# Patient Record
Sex: MALE | Race: BLACK OR AFRICAN AMERICAN | NOT HISPANIC OR LATINO | Employment: UNEMPLOYED | URBAN - METROPOLITAN AREA
[De-identification: names, ages, dates, MRNs, and addresses within clinical notes are randomized per-mention and may not be internally consistent; named-entity substitution may affect disease eponyms.]

---

## 2024-09-05 ENCOUNTER — OFFICE VISIT (OUTPATIENT)
Dept: FAMILY MEDICINE CLINIC | Facility: CLINIC | Age: 59
End: 2024-09-05
Payer: COMMERCIAL

## 2024-09-05 ENCOUNTER — HOSPITAL ENCOUNTER (OUTPATIENT)
Dept: GENERAL RADIOLOGY | Facility: HOSPITAL | Age: 59
Discharge: HOME | End: 2024-09-05
Admitting: FAMILY MEDICINE
Payer: COMMERCIAL

## 2024-09-05 VITALS
SYSTOLIC BLOOD PRESSURE: 155 MMHG | WEIGHT: 259.8 LBS | DIASTOLIC BLOOD PRESSURE: 99 MMHG | HEIGHT: 71 IN | OXYGEN SATURATION: 97 % | BODY MASS INDEX: 36.37 KG/M2 | HEART RATE: 74 BPM

## 2024-09-05 DIAGNOSIS — M25.562 CHRONIC PAIN OF LEFT KNEE: Primary | ICD-10-CM

## 2024-09-05 DIAGNOSIS — G89.29 CHRONIC PAIN OF LEFT KNEE: ICD-10-CM

## 2024-09-05 DIAGNOSIS — Z12.11 COLON CANCER SCREENING: ICD-10-CM

## 2024-09-05 DIAGNOSIS — E66.01 CLASS 2 SEVERE OBESITY DUE TO EXCESS CALORIES WITH SERIOUS COMORBIDITY AND BODY MASS INDEX (BMI) OF 36.0 TO 36.9 IN ADULT: ICD-10-CM

## 2024-09-05 DIAGNOSIS — I10 PRIMARY HYPERTENSION: ICD-10-CM

## 2024-09-05 DIAGNOSIS — G89.29 CHRONIC PAIN OF LEFT KNEE: Primary | ICD-10-CM

## 2024-09-05 DIAGNOSIS — M25.562 CHRONIC PAIN OF LEFT KNEE: ICD-10-CM

## 2024-09-05 PROBLEM — E66.812 CLASS 2 SEVERE OBESITY DUE TO EXCESS CALORIES WITH SERIOUS COMORBIDITY AND BODY MASS INDEX (BMI) OF 36.0 TO 36.9 IN ADULT: Status: ACTIVE | Noted: 2024-09-05

## 2024-09-05 PROCEDURE — 1160F RVW MEDS BY RX/DR IN RCRD: CPT | Performed by: FAMILY MEDICINE

## 2024-09-05 PROCEDURE — 73560 X-RAY EXAM OF KNEE 1 OR 2: CPT

## 2024-09-05 PROCEDURE — 3077F SYST BP >= 140 MM HG: CPT | Performed by: FAMILY MEDICINE

## 2024-09-05 PROCEDURE — 99204 OFFICE O/P NEW MOD 45 MIN: CPT | Performed by: FAMILY MEDICINE

## 2024-09-05 PROCEDURE — 3080F DIAST BP >= 90 MM HG: CPT | Performed by: FAMILY MEDICINE

## 2024-09-05 PROCEDURE — 1159F MED LIST DOCD IN RCRD: CPT | Performed by: FAMILY MEDICINE

## 2024-09-05 RX ORDER — ALBUTEROL SULFATE 90 UG/1
2 AEROSOL, METERED RESPIRATORY (INHALATION) AS NEEDED
COMMUNITY
Start: 2024-08-28

## 2024-09-05 RX ORDER — LISINOPRIL 20 MG/1
20 TABLET ORAL DAILY
COMMUNITY
Start: 2024-08-28

## 2024-09-05 RX ORDER — LISINOPRIL 10 MG/1
10 TABLET ORAL DAILY
COMMUNITY
Start: 2024-05-15

## 2024-09-05 NOTE — PROGRESS NOTES
New Patient Office Visit      Date: 2024   Patient Name: Salomon Argueta  : 1965   MRN: [id number]     Chief Complaint:    Chief Complaint   Patient presents with    Knee Pain     Pt states his left knee has been in pain he states he doesn't have any tissue in it . Its been an ongoing issue for a while       History of Present Illness: Salomon Argueta is a 59 y.o. male who is here today to establish care.  He currently lives with his fiancée.  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      Patient has a history of chronic left knee pain.  He notes that he was fairly physically active in his younger years and had a lot of strain on his knees.  He does note that he had an injury to his left knee and he has significant muscle wasting on his medial lower thigh.  He notes that due to this he can walk but whenever he tries to run or jog he will have significant weakness in his leg will give out.  He notes that is difficult for him to perform certain jobs and functions due to this.  He does note that he was previously following with a provider in [place] and was recommended a knee brace and an MRI, however he moved from the area and did not have any further follow-up.  He is interested today in establishing with a new orthopedics and beginning this workup once again.    Patient has a diagnosis of hypertension is currently taking lisinopril.  He notes that he underwent some blood work around 4 months ago and was told was overall normal.  Will obtain record.    Patient know his grandmother had cancer but he is unsure what type.  He would be interested in Cologuard today.      Subjective      Review of Systems:   Review of Systems   Musculoskeletal:  Positive for arthralgias.   All other systems reviewed and are negative.      Past Medical History:   Past Medical History:   Diagnosis Date    Hypertension        Past Surgical History: History reviewed. No pertinent surgical  "history.    Family History:   Family History   Problem Relation Age of Onset    Cancer Maternal Grandmother        Social History:   Social History     Socioeconomic History    Marital status: Single   Tobacco Use    Smoking status: Never    Smokeless tobacco: Never   Vaping Use    Vaping status: Never Used   Substance and Sexual Activity    Alcohol use: Not Currently     Comment: not since 2011    Drug use: Not Currently       Medications:     Current Outpatient Medications:     lisinopril (PRINIVIL,ZESTRIL) 10 MG tablet, Take 1 tablet by mouth Daily., Disp: , Rfl:     lisinopril (PRINIVIL,ZESTRIL) 20 MG tablet, Take 1 tablet by mouth Daily., Disp: , Rfl:     Ventolin  (90 Base) MCG/ACT inhaler, Inhale 2 puffs As Needed., Disp: , Rfl:     Allergies:   No Known Allergies    Objective     Physical Exam:  Vital Signs:   Vitals:    09/05/24 1258   BP: 155/99   Pulse: 74   SpO2: 97%   Weight: 118 kg (259 lb 12.8 oz)   Height: 180.3 cm (71\")     Body mass index is 36.23 kg/m².  Class 2 Severe Obesity (BMI >=35 and <=39.9). Obesity-related health conditions include the following: hypertension. Obesity is newly identified. BMI is is above average; BMI management plan is completed. We discussed portion control, increasing exercise, and Information on healthy weight added to patient's after visit summary.       Physical Exam  Vitals and nursing note reviewed.   Constitutional:       Appearance: Normal appearance. He is obese.   HENT:      Head: Normocephalic and atraumatic.      Nose: Nose normal.      Mouth/Throat:      Mouth: Mucous membranes are moist.   Eyes:      Extraocular Movements: Extraocular movements intact.      Pupils: Pupils are equal, round, and reactive to light.   Cardiovascular:      Rate and Rhythm: Normal rate and regular rhythm.   Pulmonary:      Effort: Pulmonary effort is normal.      Breath sounds: Normal breath sounds.   Abdominal:      General: Abdomen is flat. Bowel sounds are normal.      " Palpations: Abdomen is soft.   Musculoskeletal:         General: Normal range of motion.      Cervical back: Normal range of motion.        Legs:       Comments: Wasting of muscle on left lower medial thigh   Skin:     General: Skin is warm and dry.   Neurological:      General: No focal deficit present.      Mental Status: He is alert.   Psychiatric:         Mood and Affect: Mood normal.         Behavior: Behavior normal.         Thought Content: Thought content normal.         Judgment: Judgment normal.         Procedures    Results:   PHQ-9 Total Score: 0            Assessment / Plan      Assessment/Plan:   Diagnoses and all orders for this visit:    1. Chronic pain of left knee (Primary)  Assessment & Plan:  Will obtain x-ray of left knee today and refer to local orthopedics to establish care    Orders:  -     XR Knee 1 or 2 View Left; Future  -     Ambulatory Referral to Orthopedic Surgery    2. Class 2 severe obesity due to excess calories with serious comorbidity and body mass index (BMI) of 36.0 to 36.9 in adult  Assessment & Plan:  Patient's (Body mass index is 36.23 kg/m².) indicates that they are morbidly/severely obese (BMI > 40 or > 35 with obesity - related health condition) with health conditions that include hypertension . Weight is newly identified. BMI  is above average; BMI management plan is completed. We discussed portion control, increasing exercise, and Information on healthy weight added to patient's after visit summary.       3. Primary hypertension  Assessment & Plan:  Hypertension is stable and controlled  Continue current treatment regimen.  Weight loss.  Regular aerobic exercise.  Ambulatory blood pressure monitoring.  Blood pressure will be reassessed in 3 months.      4. Colon cancer screening  -     Cologuard - Stool, Per Rectum; Future         Follow Up:   Return in about 3 months (around 12/5/2024) for Annual physical.    Mitzy Mueller DO   [facility] [address]

## 2024-09-05 NOTE — ASSESSMENT & PLAN NOTE
Patient's (Body mass index is 36.23 kg/m².) indicates that they are morbidly/severely obese (BMI > 40 or > 35 with obesity - related health condition) with health conditions that include hypertension . Weight is newly identified. BMI  is above average; BMI management plan is completed. We discussed portion control, increasing exercise, and Information on healthy weight added to patient's after visit summary.

## 2024-09-12 ENCOUNTER — PATIENT ROUNDING (BHMG ONLY) (OUTPATIENT)
Dept: FAMILY MEDICINE CLINIC | Facility: CLINIC | Age: 59
End: 2024-09-12
Payer: COMMERCIAL

## 2024-10-14 ENCOUNTER — OFFICE VISIT (OUTPATIENT)
Age: 59
End: 2024-10-14
Payer: COMMERCIAL

## 2024-10-14 VITALS
HEIGHT: 70 IN | SYSTOLIC BLOOD PRESSURE: 158 MMHG | WEIGHT: 259 LBS | DIASTOLIC BLOOD PRESSURE: 88 MMHG | BODY MASS INDEX: 37.08 KG/M2

## 2024-10-14 DIAGNOSIS — M25.462 EFFUSION OF LEFT KNEE: ICD-10-CM

## 2024-10-14 DIAGNOSIS — M25.562 LEFT KNEE PAIN, UNSPECIFIED CHRONICITY: Primary | ICD-10-CM

## 2024-10-14 DIAGNOSIS — M17.12 PRIMARY OSTEOARTHRITIS OF LEFT KNEE: ICD-10-CM

## 2024-10-14 PROCEDURE — 3079F DIAST BP 80-89 MM HG: CPT | Performed by: STUDENT IN AN ORGANIZED HEALTH CARE EDUCATION/TRAINING PROGRAM

## 2024-10-14 PROCEDURE — 99204 OFFICE O/P NEW MOD 45 MIN: CPT | Performed by: STUDENT IN AN ORGANIZED HEALTH CARE EDUCATION/TRAINING PROGRAM

## 2024-10-14 PROCEDURE — 1159F MED LIST DOCD IN RCRD: CPT | Performed by: STUDENT IN AN ORGANIZED HEALTH CARE EDUCATION/TRAINING PROGRAM

## 2024-10-14 PROCEDURE — 1160F RVW MEDS BY RX/DR IN RCRD: CPT | Performed by: STUDENT IN AN ORGANIZED HEALTH CARE EDUCATION/TRAINING PROGRAM

## 2024-10-14 PROCEDURE — 3077F SYST BP >= 140 MM HG: CPT | Performed by: STUDENT IN AN ORGANIZED HEALTH CARE EDUCATION/TRAINING PROGRAM

## 2024-10-14 RX ORDER — MELOXICAM 15 MG/1
15 TABLET ORAL DAILY
Qty: 90 TABLET | Refills: 1 | Status: SHIPPED | OUTPATIENT
Start: 2024-10-14

## 2024-10-14 NOTE — PROGRESS NOTES
[facility] [place]     Office Visit       Date: 10/14/2024   Patient Name: Salomon Argueta  MRN: [id number]  YOB: 1965    Referring Physician: Mitzy Mueller, *     Chief Complaint:   Chief Complaint   Patient presents with    Left Knee - Pain       History of Present Illness:   Salomon Argueta is a 59 y.o. male who presents with left knee pain for 10 year(s). Onset atraumatic and gradual in nature. Pain is localized to the medial joint line and is a 6/10 on the pain scale. Pain is described as aching and stabbing. Associated symptoms include pain, popping, grinding, and giving way/buckling. The pain is worse with walking, standing, climbing stairs, sleeping, leisure, and rising from seated position; heat make it better. Previous treatments have included: bracing since symptom onset. Although some transient relief was reported with these interventions, these conservative measures have failed and symptoms have persisted. The patient is limited in daily activities and has had a significant decrease in quality of life as a result. He denies fevers, chills, or constitutional symptoms.    Subjective   Review of Systems: Review of Systems   Constitutional:  Negative for chills, fever, unexpected weight gain and unexpected weight loss.   HENT:  Negative for congestion, postnasal drip and rhinorrhea.    Eyes:  Negative for blurred vision.   Respiratory:  Negative for shortness of breath.    Cardiovascular:  Negative for leg swelling.   Gastrointestinal:  Negative for abdominal pain, nausea and vomiting.   Genitourinary:  Negative for difficulty urinating.   Musculoskeletal:  Positive for arthralgias. Negative for gait problem, joint swelling and myalgias.   Skin:  Negative for skin lesions and wound.   Neurological:  Negative for dizziness, weakness, light-headedness and numbness.   Hematological:  Does not bruise/bleed easily.  "  Psychiatric/Behavioral:  Negative for depressed mood.    All other systems reviewed and are negative.       I have reviewed the following portions of the patient's history:History of Present Illness and review of systems.    Past Medical History:   Past Medical History:   Diagnosis Date    Hypertension        Past Surgical History: No past surgical history on file.    Family History:   Family History   Problem Relation Age of Onset    Cancer Maternal Grandmother        Social History:   Social History     Socioeconomic History    Marital status: Single   Tobacco Use    Smoking status: Never    Smokeless tobacco: Never   Vaping Use    Vaping status: Never Used   Substance and Sexual Activity    Alcohol use: Not Currently     Comment: not since 2011    Drug use: Not Currently       Medications:   Current Outpatient Medications:     lisinopril (PRINIVIL,ZESTRIL) 10 MG tablet, Take 1 tablet by mouth Daily., Disp: , Rfl:     lisinopril (PRINIVIL,ZESTRIL) 20 MG tablet, Take 1 tablet by mouth Daily., Disp: , Rfl:     Ventolin  (90 Base) MCG/ACT inhaler, Inhale 2 puffs As Needed., Disp: , Rfl:     meloxicam (MOBIC) 15 MG tablet, Take 1 tablet by mouth Daily., Disp: 90 tablet, Rfl: 1    Allergies: No Known Allergies    I reviewed the patient's chief complaint, history of present illness, review of systems, past medical history, surgical history, family history, social history, medications and allergy list.     Objective    Vital Signs:   Vitals:    10/14/24 1333   BP: 158/88   Weight: 117 kg (259 lb)   Height: 176.5 cm (69.5\")     Body mass index is 37.7 kg/m².   Class 2 Severe Obesity (BMI >=35 and <=39.9). Obesity-related health conditions include the following: hypertension, coronary heart disease, diabetes mellitus, and impaired fasting glucose. Obesity is newly identified. BMI is is above average; BMI management plan is completed. We discussed portion control and increasing exercise.      Patient reports that " he is a non-smoker and has not ever been a smoker.  This behavior was applauded and he was encouraged to continue in smoking cessation.  We will continue to monitor at subsequent visits.    Ortho Exam:  Left knee exam:   There is swelling and effusion but no warmth or erythema of the knee.    The skin is clear.    Overall the alignment of the knee is varus   The patient has 5/5 strength with ankle plantar flexion, dorsiflexion, inversion, eversion, and great toe extension.    Sensation is grossly present to light touch in the superficial peroneal, deep peroneal, and tibial nerve distributions.    The dorsalis pedis pulse is 2+ and the foot is well perfused with brisk capillary refill.   Active ROM is 0° to 110°.   Passive ROM is 0° to 110°.   Pseudolaxity with valgus stress testing.  The knee shows no gross instability to varus stress testing, anterior/posterior drawer sign, and Lachman testing.    There is tenderness to palpation over the medial/lateral joint line. Patellar grind test is positive.   Hip ROM is full and painless.    Results Review:   Imaging Results (Last 24 Hours)       Procedure Component Value Units Date/Time    XR Knee 4+ View Left [[id number]] Resulted: 10/14/24 1403     Updated: 10/14/24 1403    Narrative:      Indication: Left knee pain.      Views: Weightbearing views of the knee are submitted.     Impression: There is no fracture subluxation or dislocation. The patella   sits normally in the trochlea. There are no acute findings. There is   medial joint space narrowing with osteophyte formation and subchondral   sclerosis and mild varus deformity. Also patellofemoral joint space   narrowing noted.    Comparison: No significant change from 9/5/2024.               Procedures    Assessment / Plan    Assessment/Plan:   Diagnoses and all orders for this visit:    1. Left knee pain, unspecified chronicity (Primary)  -     XR Knee 4+ View Left    2. Primary osteoarthritis of left knee  -      meloxicam (MOBIC) 15 MG tablet; Take 1 tablet by mouth Daily.  Dispense: 90 tablet; Refill: 1    3. Effusion of left knee    Left knee pain and swelling ongoing for last several months.  No acute mechanism of injury.  Radiographs of the left knee show degenerative changes of the medial and patellofemoral joint space.  This coincides with his locations of pain.  Discussed his radiographic findings with him in detail.  We reviewed the diagnosis of knee osteoarthritis and how it leads to a joint effusion. We discussed a comprehensive nonoperative treatment approach to knee osteoarthritis. This includes topical and oral anti-inflammatory medication, physical therapy, bracing, activity modification, and periodic as needed corticosteroid or viscosupplementation injections. Today, we will start treatment with meloxicam to control the pain and swelling.  There is noticeable pseudolaxity with valgus stress of the knee.  Recommend that we place him into a drawtex hinged knee brace to help with that and avoid any further falling.  If symptoms do not improve, happy see him back but follow-up will be as needed.    Follow Up:   Return if symptoms worsen or fail to improve.      Buddy Kenney MD  [facility]

## 2024-11-25 ENCOUNTER — PATIENT OUTREACH (OUTPATIENT)
Dept: CASE MANAGEMENT | Facility: OTHER | Age: 59
End: 2024-11-25
Payer: MEDICAID

## 2024-11-25 NOTE — OUTREACH NOTE
[facility] Enrollment    Date/Time of successful contact: 11/25/2024  3:43 PM  Patient response: not interested  Enrolling provider: Mitzy Mueller,      Patient said he does not have a BP monitor.  Discussed options of buying one, out of pocket at local pharmacy; or calling his insurance and asking if they cover the cost of a BP monitor, and if so, how / where does he obtain it?  Patient stated he does take his BP med every day as directed.  He said he is currently in process of moving; he declined the [facility]P-HTN at this time.  Per patient request, a 3-way call was made with patient to the PCP office of Dr. EDWIN Mueller.  Patient said he would be out of town on 12/5, when PCP appt is scheduled.  PCP appt was changed to 2/4/25 at 2:00pm with Dr. Mueller.      Care Coordination    A 3-way call was made to Dr. EDWIN Mueller's office, for patient to change his appt that is scheduled for 12/5/24.  Patient received a new appt with Dr. EDWIN Mueller for 2/4/25, at 2:00pm.      Trinity MILLER  Ambulatory Case Management    11/25/2024, 15:44 EST